# Patient Record
Sex: MALE | Race: BLACK OR AFRICAN AMERICAN | Employment: FULL TIME | URBAN - METROPOLITAN AREA
[De-identification: names, ages, dates, MRNs, and addresses within clinical notes are randomized per-mention and may not be internally consistent; named-entity substitution may affect disease eponyms.]

---

## 2022-08-05 ENCOUNTER — HOSPITAL ENCOUNTER (OUTPATIENT)
Age: 52
Setting detail: OUTPATIENT SURGERY
Discharge: HOME | End: 2022-08-05
Attending: INTERNAL MEDICINE | Admitting: INTERNAL MEDICINE
Payer: COMMERCIAL

## 2022-08-05 ENCOUNTER — ANESTHESIA (OUTPATIENT)
Dept: ENDOSCOPY | Age: 52
End: 2022-08-05
Payer: COMMERCIAL

## 2022-08-05 ENCOUNTER — ANESTHESIA EVENT (OUTPATIENT)
Dept: ENDOSCOPY | Age: 52
End: 2022-08-05
Payer: COMMERCIAL

## 2022-08-05 VITALS
OXYGEN SATURATION: 100 % | BODY MASS INDEX: 42.66 KG/M2 | DIASTOLIC BLOOD PRESSURE: 72 MMHG | WEIGHT: 315 LBS | HEART RATE: 50 BPM | TEMPERATURE: 97.6 F | HEIGHT: 72 IN | SYSTOLIC BLOOD PRESSURE: 139 MMHG | RESPIRATION RATE: 16 BRPM

## 2022-08-05 PROCEDURE — 77030021593 HC FCPS BIOP ENDOSC BSC -A: Performed by: INTERNAL MEDICINE

## 2022-08-05 PROCEDURE — 74011250636 HC RX REV CODE- 250/636: Performed by: INTERNAL MEDICINE

## 2022-08-05 PROCEDURE — 77030013991 HC SNR POLYP ENDOSC BSC -A: Performed by: INTERNAL MEDICINE

## 2022-08-05 PROCEDURE — 2709999900 HC NON-CHARGEABLE SUPPLY: Performed by: INTERNAL MEDICINE

## 2022-08-05 PROCEDURE — 76040000019: Performed by: INTERNAL MEDICINE

## 2022-08-05 PROCEDURE — 76060000031 HC ANESTHESIA FIRST 0.5 HR: Performed by: INTERNAL MEDICINE

## 2022-08-05 PROCEDURE — 88305 TISSUE EXAM BY PATHOLOGIST: CPT

## 2022-08-05 PROCEDURE — 74011000250 HC RX REV CODE- 250: Performed by: INTERNAL MEDICINE

## 2022-08-05 RX ORDER — FLUMAZENIL 0.1 MG/ML
0.2 INJECTION INTRAVENOUS
Status: CANCELLED | OUTPATIENT
Start: 2022-08-05 | End: 2022-08-05

## 2022-08-05 RX ORDER — SODIUM CHLORIDE 0.9 % (FLUSH) 0.9 %
5-40 SYRINGE (ML) INJECTION AS NEEDED
Status: DISCONTINUED | OUTPATIENT
Start: 2022-08-05 | End: 2022-08-05 | Stop reason: HOSPADM

## 2022-08-05 RX ORDER — AMLODIPINE BESYLATE 10 MG/1
TABLET ORAL DAILY
COMMUNITY

## 2022-08-05 RX ORDER — PROPOFOL 10 MG/ML
INJECTION, EMULSION INTRAVENOUS AS NEEDED
Status: DISCONTINUED | OUTPATIENT
Start: 2022-08-05 | End: 2022-08-05 | Stop reason: HOSPADM

## 2022-08-05 RX ORDER — ATENOLOL 25 MG/1
25 TABLET ORAL DAILY
COMMUNITY

## 2022-08-05 RX ORDER — BUMETANIDE 1 MG/1
TABLET ORAL DAILY
COMMUNITY

## 2022-08-05 RX ORDER — EPINEPHRINE 0.1 MG/ML
1 INJECTION INTRACARDIAC; INTRAVENOUS
Status: CANCELLED | OUTPATIENT
Start: 2022-08-05 | End: 2022-08-06

## 2022-08-05 RX ORDER — ATORVASTATIN CALCIUM 80 MG/1
80 TABLET, FILM COATED ORAL DAILY
COMMUNITY

## 2022-08-05 RX ORDER — LIDOCAINE HYDROCHLORIDE 20 MG/ML
INJECTION, SOLUTION EPIDURAL; INFILTRATION; INTRACAUDAL; PERINEURAL AS NEEDED
Status: DISCONTINUED | OUTPATIENT
Start: 2022-08-05 | End: 2022-08-05 | Stop reason: HOSPADM

## 2022-08-05 RX ORDER — DEXTROMETHORPHAN/PSEUDOEPHED 2.5-7.5/.8
1.2 DROPS ORAL
Status: CANCELLED | OUTPATIENT
Start: 2022-08-05

## 2022-08-05 RX ORDER — NALOXONE HYDROCHLORIDE 0.4 MG/ML
0.4 INJECTION, SOLUTION INTRAMUSCULAR; INTRAVENOUS; SUBCUTANEOUS
Status: CANCELLED | OUTPATIENT
Start: 2022-08-05 | End: 2022-08-05

## 2022-08-05 RX ORDER — ATROPINE SULFATE 0.1 MG/ML
0.5 INJECTION INTRAVENOUS
Status: CANCELLED | OUTPATIENT
Start: 2022-08-05 | End: 2022-08-06

## 2022-08-05 RX ORDER — LISINOPRIL 5 MG/1
TABLET ORAL DAILY
COMMUNITY

## 2022-08-05 RX ORDER — SODIUM CHLORIDE 0.9 % (FLUSH) 0.9 %
5-40 SYRINGE (ML) INJECTION EVERY 8 HOURS
Status: DISCONTINUED | OUTPATIENT
Start: 2022-08-05 | End: 2022-08-05 | Stop reason: HOSPADM

## 2022-08-05 RX ORDER — SODIUM CHLORIDE 9 MG/ML
125 INJECTION, SOLUTION INTRAVENOUS CONTINUOUS
Status: DISCONTINUED | OUTPATIENT
Start: 2022-08-05 | End: 2022-08-05 | Stop reason: HOSPADM

## 2022-08-05 RX ADMIN — PROPOFOL 30 MG: 10 INJECTION, EMULSION INTRAVENOUS at 15:02

## 2022-08-05 RX ADMIN — SODIUM CHLORIDE 125 ML/HR: 9 INJECTION, SOLUTION INTRAVENOUS at 14:14

## 2022-08-05 RX ADMIN — PROPOFOL 30 MG: 10 INJECTION, EMULSION INTRAVENOUS at 15:03

## 2022-08-05 RX ADMIN — LIDOCAINE HYDROCHLORIDE 60 MG: 20 INJECTION, SOLUTION INTRAVENOUS at 14:56

## 2022-08-05 RX ADMIN — PROPOFOL 30 MG: 10 INJECTION, EMULSION INTRAVENOUS at 14:58

## 2022-08-05 RX ADMIN — PROPOFOL 30 MG: 10 INJECTION, EMULSION INTRAVENOUS at 15:10

## 2022-08-05 RX ADMIN — PROPOFOL 30 MG: 10 INJECTION, EMULSION INTRAVENOUS at 15:07

## 2022-08-05 RX ADMIN — PROPOFOL 30 MG: 10 INJECTION, EMULSION INTRAVENOUS at 15:05

## 2022-08-05 RX ADMIN — PROPOFOL 30 MG: 10 INJECTION, EMULSION INTRAVENOUS at 15:00

## 2022-08-05 RX ADMIN — PROPOFOL 120 MG: 10 INJECTION, EMULSION INTRAVENOUS at 14:56

## 2022-08-05 RX ADMIN — PROPOFOL 30 MG: 10 INJECTION, EMULSION INTRAVENOUS at 15:04

## 2022-08-05 RX ADMIN — PROPOFOL 30 MG: 10 INJECTION, EMULSION INTRAVENOUS at 15:08

## 2022-08-05 NOTE — PROCEDURES
Colonoscopy Procedure Note    Indications: Previous adenomatous polyp    Anesthesia/Sedation: MAC anesthesia Propofol    Pre-Procedure Exam:  Airway: clear   Heart: normal S1and S2    Lungs: clear bilateral  Abdomen: soft, nontender, bowel sounds present and normal in all quadrants   Mental Status: awake, alert, and oriented to person, place, and time      Procedure in Detail:  Informed consent was obtained for the procedure, including sedation. Risks of perforation, hemorrhage, adverse drug reaction, and aspiration were discussed. The patient was placed in the left lateral decubitus position. Based on the pre-procedure assessment, including review of the patient's medical history, medications, allergies, and review of systems, he had been deemed to be an appropriate candidate for moderate sedation; he was therefore sedated with the medications listed above. The patient was monitored continuously with ECG tracing, pulse oximetry, blood pressure monitoring, and direct observations. A rectal examination was performed. The [id number] was inserted into the rectum and advanced under direct vision to the cecum, which was identified by the ileocecal valve and appendiceal orifice. The quality of the colonic preparation was excellent. A careful inspection was made as the colonoscope was withdrawn, including a retroflexed view of the rectum; findings and interventions are described below. Appropriate photodocumentation was obtained. ANUS: Anal exam reveals no masses or hemorrhoids, sphincter tone is normal.   RECTUM: Rectal exam reveals no masses or hemorrhoids. 5mm rectal polyp removed with cold snare  SIGMOID COLON: The mucosa is normal with good vascular pattern and without ulcers, diverticula, and polyps. DESCENDING COLON: The mucosa is normal with good vascular pattern and without ulcers, diverticula, and polyps.    SPLENIC FLEXURE: The splenic flexure is normal.   TRANSVERSE COLON: The mucosa is normal with good vascular pattern and without ulcers, diverticula, and polyps. HEPATIC FLEXURE: The hepatic flexure is normal.   ASCENDING COLON: The mucosa is normal with good vascular pattern and without ulcers, diverticula, and polyps. CECUM: The appendiceal orifice appears normal. The ileocecal valve appears normal.   TERMINAL ILEUM: The terminal ileum was not entered. Specimens: Specimens were collected and sent to pathology. EBL: None    No prosthetic devices, grafts, tissues, transplant or devices implanted. Withdrawal Time: 9 min    Complications: None; patient tolerated the procedure well. Attending Attestation: I performed the procedure. Recommendations:   - Await pathology.     Signed By: George Carlos MD                      8/5/2022

## 2022-08-05 NOTE — DISCHARGE INSTRUCTIONS
Dl Mckeon  [de-identified]  1970    COLON DISCHARGE INSTRUCTIONS    Discomfort:  Redness at IV site- apply warm compress to area; if redness or soreness persist- contact your physician  There may be a slight amount of blood passed from the rectum  Gaseous discomfort- walking, belching will help relieve any discomfort  You should not operate a vehicle for 12 hours  You should not engage in an occupation involving machinery or appliances for rest of today  You may not drink alcoholic beverages for at least 12 hours  Avoid making any critical decisions for at least 24 hour  DIET:   Regular diet. - however -  remember your colon is empty and a heavy meal will produce gas. Avoid these foods:  vegetables, fried / greasy foods, carbonated drinks for today     ACTIVITY:  You may resume your normal daily activities it is recommended that you spend the remainder of the day resting -  avoid any strenuous activity. CALL M.D. ANY SIGN OF:   Increasing pain, nausea, vomiting  Abdominal distension (swelling)  New increased bleeding (oral or rectal)  Fever (chills)  Pain in chest area  Bloody discharge from nose or mouth  Shortness of breath      Follow-up Instructions: Will send polyp pathology results and when next colonoscopy due.                           Dl Mckeon  [de-identified]  1970        DISCHARGE SUMMARY from Nurse    The following personal items collected during your admission are returned to you:   Dental Appliance: Dental Appliances: None  Vision: Visual Aid: Glasses  Hearing Aid:    Jewelry:    Clothing:    Other Valuables:    Valuables sent to safe:                  DISCHARGE SUMMARY from Nurse    The following personal items collected during your admission are returned to you:   Dental Appliance: Dental Appliances: None  Vision: Visual Aid: Glasses  Hearing Aid:    Jewelry:    Clothing:    Other Valuables:    Valuables sent to safe:              PATIENT INSTRUCTIONS:    After general anesthesia or intravenous sedation, for 24 hours or while taking prescription Narcotics:  Limit your activities  Do not drive and operate hazardous machinery  Do not make important personal or business decisions  Do  not drink alcoholic beverages  If you have not urinated within 8 hours after discharge, please contact your surgeon on call. Report the following to your surgeon:  Excessive pain, swelling, redness or odor of or around the surgical area  Temperature over 100.5  Nausea and vomiting lasting longer than 4 hours or if unable to take medications  Any signs of decreased circulation or nerve impairment to extremity: change in color, persistent  numbness, tingling, coldness or increase pain  Any questions      [unfilled]    What to do at Home:  Recommended activity: as above,     If you experience any of the following symptoms as above, please follow up with Dr. Gael Sky. *  Please give a list of your current medications to your Primary Care Provider. *  Please update this list whenever your medications are discontinued, doses are      changed, or new medications (including over-the-counter products) are added. *  Please carry medication information at all times in case of emergency situations. These are general instructions for a healthy lifestyle:    No smoking/ No tobacco products/ Avoid exposure to second hand smoke    Surgeon General's Warning:  Quitting smoking now greatly reduces serious risk to your health. Obesity, smoking, and sedentary lifestyle greatly increases your risk for illness    A healthy diet, regular physical exercise & weight monitoring are important for maintaining a healthy lifestyle    You may be retaining fluid if you have a history of heart failure or if you experience any of the following symptoms:  Weight gain of 3 pounds or more overnight or 5 pounds in a week, increased swelling in our hands or feet or shortness of breath while lying flat in bed.   Please call your doctor as soon as you notice any of these symptoms; do not wait until your next office visit. Recognize signs and symptoms of STROKE:    F-face looks uneven    A-arms unable to move or move unevenly    S-speech slurred or non-existent    T-time-call 911 as soon as signs and symptoms begin-DO NOT go       Back to bed or wait to see if you get better-TIME IS BRAIN. The discharge information has been reviewed with the patient and spouse. The patient and spouse verbalized understanding. Warning Signs of HEART ATTACK     Call 911 if you have these symptoms:  Chest discomfort. Most heart attacks involve discomfort in the center of the chest that lasts more than a few minutes, or that goes away and comes back. It can feel like uncomfortable pressure, squeezing, fullness, or pain. Discomfort in other areas of the upper body. Symptoms can include pain or discomfort in one or both arms, the back, neck, jaw, or stomach. Shortness of breath with or without chest discomfort. Other signs may include breaking out in a cold sweat, nausea, or lightheadedness. Don't wait more than five minutes to call 911 - MINUTES MATTER! Fast action can save your life. Calling 911 is almost always the fastest way to get lifesaving treatment. Emergency Medical Services staff can begin treatment when they arrive -- up to an hour sooner than if someone gets to the hospital by car. The discharge information has been reviewed with the patient and caregiver. The patient and caregiver verbalized understanding. Discharge medications reviewed with the patient and guardian and appropriate educational materials and side effects teaching were provided.     Patient armband removed and shredded

## 2022-08-05 NOTE — H&P
[facility]ology [address] [address]  Tel: [phone]  Fax: [phone]    Patient:  Rick Paula  YOB: 1970  Birth Sex:  Male  Current Gender:  Male  Date:   06/13/2022 8:00 AM   Historian:    self  Visit Type:   Office Visit        Completed Orders (This Visit)  Order  Details  Reason  Side  Interpretation  Result  Initial Treatment Date  Region  Referrals: Gastroenterology. Rebecca Paulson MD. Evaluate and treat                Weight monitoring                Dietary management education, guidance, and counseling                  Assessment/Plan  #  Detail Type  Description   1. Assessment  Benign essential hypertension (I10). Patient Plan  This pleasant 47 y/o gentleman was referred for repeat colonoscopy. He is advised to take his antihypertensive medication as prescribed on day of procedure. 2.  Assessment  Morbid (severe) obesity due to excess calories (E66.01). Patient Plan  Procedure to be done at [facility]         3. Assessment  Personal history of colonic polyps (Z86.010). Patient Plan  His last colo was in 2019 with four benign polyps and recommendation to return in 3 years. Pertinent negatives include family history of CRC, personal history of cancer, changes in bowel habits, hematochezia, abdominal pain, n&v and unintentional weight loss. Schedule for COLO with MAC, miralax prep. The risks, benefits, adverse reactions were discussed in detail today with the patient. This includes, but is not limited to, the risk of bleeding, infections, perforation, death, missed lesions, reactions to anesthesia/sedation, other. Patient understands and agrees to undergo the procedure. 4.  Assessment  Body mass index (BMI) 50-59.9 , adult (Z68.43). Plan Orders  Today's instructions / counseling include(s) Dietary management education, guidance, and counseling. Weight monitoring              This 46year old patient was referred by Harlie Sandifer. This 46year old male presents for Hx polyp/colon cancer. History of Present Illness  1. Hx polyp/colon cancer   Prior screening:  Colonoscopy 2019. Risk Factors: History of Polyps. There are no associated symptoms. Pertinent negatives include abdominal pain, change in bowel habits, change in stool caliber, constipation, decreased appetite, diarrhea, melena, nausea, rectal bleeding, vomiting, weight gain and weight loss. Problem List  Problem List reviewed. Problem Description  Onset Date  Chronic  Clinical Status  Notes  Allergic rhinitis  2015  Y      Obstructive sleep apnea of adult  2000  Y      Vitamin D deficiency  10/07/2014  Y      Hyperlipidemia  2008  Y      Benign essential hypertension  2007  Y      Morbid obesity  2007  Y      Long-term current use of drug therapy  2007  Y      Type 2 diabetes  2020  Y        Past Medical/Surgical History   (Detailed)  Disease/disorder  Onset Date  Management  Date  Comments  Vitamin D deficiency              Colonoscopy with possible biopsies and/or polypectomies as needed      Elevated lipids          Hypertension          Sleep apnea                Family History   (Detailed)    Relationship  Family Member Name    Age at Death  Condition  Onset Age  Cause of Death  Brother  Carla Olmstead  Father  Cole Sosa  Father  Yuval Dozier    Hypertension    N  Mother  Cristal  Y        N  Mother  Cristal  Y    Hypertension    N    Social History  (Detailed)  Tobacco use reviewed. Preferred language is [place]. Marital Status/Family/Social Support  Marital status:     Tobacco use status: Ex-cigarette smoker. Smoking status: Former smoker.     Tobacco Screening  Patient has used tobacco.     Smoking Status  Type  Smoking Status  Usage Per Day  Years Used  Pack Years  Total Pack Years  Cigarette  Former smoker  1 Packs  19.00  19.00  19.00    Vaping Use  Screened for vaping? Yes  Status: Not a current user    Tobacco/Vaping Exposure  No passive smoke exposure. Alcohol  There is a history of alcohol use. Type: Beer and liquor. 3 beers consumed weekly. Last alcoholic drink was last week. Caffeine  The patient uses caffeine: tea and soda. - 32 oz a day. Lifestyle  Sedentary activity level. Exercises 2-3 times a week. Diet  healthy. Medications (active prior to today)  Medication  Instructions  Start Date  Stop Date  Refilled  Elsewhere  Vitamin D3 2,000 unit capsule  take 1 capsule daily  10/07/2014      N  amlodipine 10 mg tablet  take 1 tablet by oral route  every day  03/30/2022 03/30/2022  N  lisinopril 40 mg tablet  take 1 tablet by oral route  every day  04/14/2022 04/14/2022  N  atenolol 100 mg tablet  take 1 tablet by oral route  every day  04/14/2022 04/14/2022  N  bumetanide 1 mg tablet  TAKE 1 TABLET BID  05/17/2022 05/17/2022  N  atorvastatin 80 mg tablet  TAKE 1 TABLET DAILY  05/23/2022 05/23/2022  N    Patient Status   Completed with information received for patient transitioning into care. Completed with information received for patient in a summary of care record. Medication Reconciliation  Medications reconciled today.     Medication Reviewed  Adherence  Medication Name  Sig Desc  Elsewhere  Status  taking as directed  Vitamin D3 2,000 unit capsule  take 1 capsule daily  N  Verified  taking as directed  amlodipine 10 mg tablet  take 1 tablet by oral route  every day  N  Verified  taking as directed  lisinopril 40 mg tablet  take 1 tablet by oral route  every day  N  Verified  taking as directed  atenolol 100 mg tablet  take 1 tablet by oral route  every day  N  Verified  taking as directed  bumetanide 1 mg tablet  TAKE 1 TABLET BID  N  Verified  taking as directed  atorvastatin 80 mg tablet  TAKE 1 TABLET DAILY  N  Verified    Medications (Added, Continued or Stopped today)  Start Date  Medication  Directions  PRN Status  PRN Reason  Instruction  Stop Date  03/30/2022  amlodipine 10 mg tablet  take 1 tablet by oral route  every day  N        04/14/2022  atenolol 100 mg tablet  take 1 tablet by oral route  every day  N        05/23/2022  atorvastatin 80 mg tablet  TAKE 1 TABLET DAILY  N        05/17/2022  bumetanide 1 mg tablet  TAKE 1 TABLET BID  N        04/14/2022  lisinopril 40 mg tablet  take 1 tablet by oral route  every day  N        10/07/2014  Vitamin D3 2,000 unit capsule  take 1 capsule daily  N          Allergies  Ingredient  Reaction (Severity)  Medication Name  Comment  NO KNOWN ALLERGIES          Reviewed, no changes. Review of Systems  System  Neg/Pos  Details  Constitutional  Negative  Chills, Fever, Weight gain and Weight loss. ENMT  Negative  Ear infections and Nasal congestion. Respiratory  Negative  Chronic cough, Dyspnea and Wheezing. Cardio  Negative  Chest pain. GI  Negative  Abdominal pain, Change in bowel habits, Change in stool caliber, Constipation, Decreased appetite, Diarrhea, Melena, Nausea, Rectal bleeding and Vomiting.   Negative  Dysuria. Endocrine  Negative  Cold intolerance and Heat intolerance. Neuro  Negative  Dizziness and Headache. Psych  Negative  Anxiety and Depression. MS  Negative  Back pain and Joint pain. Hema/Lymph  Negative  Easy bleeding and Easy bruising.       Vital Signs  Height  Time  ft  in  cm  Last Measured  Height Position  8:04 AM  6.0  0.00  182.88  06/13/2022  Standing    Weight/BSA/BMI  Time  lb  oz  kg  Context  BMI kg/m2  BSA m2  8:04 AM  385.40    174.814  dressed with shoes  52.27      Blood Pressure  Time  BP mm/Hg  Position  Side  Site  Method  Cuff Size  8:04 AM  122/72  sitting  left  arm  manual  adult large    Temperature/Pulse/Respiration  Time  Temp F  Temp C  Temp Site  Pulse/min  Pattern  Resp/ min  8:04 AM  97.00  36.11    55        Pulse Oximetry/FIO2  Time  Pulse Ox (Rest %)  Pulse Ox (Amb %)  O2 Sat  O2 L/Min  Timing  FiO2 %  L/min  Delivery Method  Finger Probe  8:04 AM  97                    Measured by  Time  Measured by  8:04 AM  [place]    Physical  Exam  Exam  Findings  Details  Constitutional  *  Overall appearance - > IBW. Constitutional  Normal  No acute distress. Well nourished. Head/Face  Normal  Facial features - Normal. Eyebrows - Normal.  Eyes  Normal  General - Right: Normal, Left: Normal. Lids/external - Right: Normal, Left: Normal. Conjunctiva - Right: Normal, Left: Normal. Sclera - Right: Normal, Left: Normal.  Ears  Normal  Inspection - Right: Normal, Left: Normal.  Nose/Mouth/Throat  Normal  External nose - Normal. Lips/teeth/gums - Normal.  Nose/Mouth/Throat  Normal  External nose - Normal. Septum - Normal. Lips/teeth/gums - Normal. Tongue - Normal.  Neck Exam  Normal  Inspection - Normal. Palpation - Normal. Thyroid gland - Normal. Range of motion - Normal.  Respiratory  Normal  Inspection - Normal. Auscultation - Normal. Effort - Normal.  Cardiovascular  Normal  Inspection - JVD: Absent. Palpation/percussion - PMI normal. Heart rate - Regular rate. Rhythm - Regular. Heart sounds - Normal S1, Normal S2. Abdomen  Normal  Patient is not obese. Inspection - Normal. Appliance(s) - None. Abdominal muscles - Normal. Auscultation - Normal. Percussion - Normal. Anterior palpation - Normal, No guarding, No rebound. CVA tenderness - None. Umbilicus - Normal. No abdominal tenderness. No hepatic enlargement. No spleen enlargement. No hernia. No ascites. No palpable mass. Ribeiro's sign - Normal. No hepatic tenderness. Genitourinary  Normal  No CVA Tenderness. No flank mass. Skin  *  Body areas examined - Scalp/hair, Head/face, Neck, Abdomen.   Skin  Normal  Inspection - Normal. Nails - Normal. Hair - Normal.  Musculoskeletal  Normal  Overview - Normal. Hands - Left: Normal, Right: Normal.  Neurological  Normal  Level of consciousness - Normal. Orientation - Normal. Memory - Normal. Cranial nerves - Cranial nerves II through XII grossly intact. Sensory - Normal. Motor - Normal. Balance & gait - Normal. Coordination - Normal.  Psychiatric  Normal  Orientation - Oriented to time, place, person & situation. Not anxious. Behavior is appropriate for age. Sufficient fund of knowledge. Sufficient language. No memory loss. No mood swings. Normal insight. Normal judgment. Immunizations Entered by History  Date  Immunization  2/19/2010 12:00:00 AM  tetanus toxoid, reduced diphtheria toxoid, and acellular pertussis vaccine, adsorbed      Active Patient Care Team Members  Name  Contact  Agency Type  Support Role  Relationship  Active Date  Inactive Date  Specialty  Sherita Mends      encounter provider        [facility]      Patient provider  PCP      Family Practice  Ivette Jacobson      encounter provider        Gastroenterology  José Munoz      Emergency Contact  Marily Briggs      encounter provider        Gastroenterology  Dl Thrasher      encounter provider        Pulmonary Medicine      Encounter submitted for review by Ivette ALONSO on 06/13/2022 8:20 AM.    Visit details reviewed and approved by supervising provider Paty Lin MD on 06/13/2022. Document generated by: Paty Lin 06/13/2022      ----------------------------------------------------------------------------------------------------------------------------------------------------------------------      Electronically signed by Ivette ALONSO on 06/14/2022 08:10 AM    Pt seen and examined. No interval change.

## 2022-08-05 NOTE — ANESTHESIA PREPROCEDURE EVALUATION
Relevant Problems   No relevant active problems       Anesthetic History   No history of anesthetic complications            Review of Systems / Medical History  Patient summary reviewed, nursing notes reviewed and pertinent labs reviewed    Pulmonary  Within defined limits                 Neuro/Psych   Within defined limits           Cardiovascular    Hypertension: well controlled              Exercise tolerance: >4 METS     GI/Hepatic/Renal  Within defined limits              Endo/Other        Morbid obesity     Other Findings              Physical Exam    Airway  Mallampati: I  TM Distance: > 6 cm  Neck ROM: normal range of motion   Mouth opening: Normal     Cardiovascular    Rhythm: regular  Rate: normal         Dental  No notable dental hx       Pulmonary  Breath sounds clear to auscultation               Abdominal  GI exam deferred       Other Findings            Anesthetic Plan    ASA: 3  Anesthesia type: MAC    Monitoring Plan: Continuous noninvasive hemodynamic monitoring      Induction: Intravenous  Anesthetic plan and risks discussed with: Patient

## 2022-08-05 NOTE — ANESTHESIA POSTPROCEDURE EVALUATION
Post-Anesthesia Evaluation and Assessment    Cardiovascular Function/Vital Signs  Visit Vitals  /80   Pulse 65   Temp 36.4 °C (97.6 °F)   Resp 16   Ht 6' (1.829 m)   Wt (!) 171.5 kg (378 lb)   SpO2 98%   BMI 51.27 kg/m²       Patient is status post Procedure(s):  COLONOSCOPY WITH MAC;POLYPECTOMY. Nausea/Vomiting: Controlled. Postoperative hydration reviewed and adequate. Pain:  Pain Scale 1: Numeric (0 - 10) (08/05/22 1525)  Pain Intensity 1: 0 (08/05/22 1525)   Managed. Neurological Status: At baseline. Mental Status and Level of Consciousness: Baseline and stable. Pulmonary Status:   O2 Device: None (Room air) (08/05/22 1525)   Adequate oxygenation and airway patent. Complications related to anesthesia: None    Post-anesthesia assessment completed. No concerns. Patient has met all discharge requirements.     Signed By: Micaela Perdomo MD

## (undated) DEVICE — TRNQT TEXT 1X18IN BLU LF DISP -- CONVERT TO ITEM 362165

## (undated) DEVICE — EJECTOR SALIVA 6 IN FLX CLR

## (undated) DEVICE — KENDALL RADIOLUCENT FOAM MONITORING ELECTRODE RECTANGULAR SHAPE: Brand: KENDALL

## (undated) DEVICE — TRAP SPEC COLL POLYP POLYSTYR --

## (undated) DEVICE — FORCEPS BX CAP 240CM L RAD JAW 4

## (undated) DEVICE — MOUTHPIECE ENDOSCP 20X27MM --

## (undated) DEVICE — SINGLE PORT MANIFOLD: Brand: NEPTUNE 2

## (undated) DEVICE — SPONGE GZ W4XL4IN RAYON POLY 4 PLY NONWOVEN FASTER WICKING

## (undated) DEVICE — 4-PORT MANIFOLD: Brand: NEPTUNE 2

## (undated) DEVICE — SYR 3ML LL TIP 1/10ML GRAD --

## (undated) DEVICE — SYR 50ML SLIP TIP NSAF LF STRL --

## (undated) DEVICE — MAJ-1414 SINGLE USE ADPATER BIOPSY VALV: Brand: SINGLE USE ADAPTOR BIOPSY VALVE

## (undated) DEVICE — BRUSH CYTO L240CM DIA2.3MM GI COLONOSCOPY 3 RNG HNDL RADPQ

## (undated) DEVICE — NDL PRT INJ NSAF BLNT 18GX1.5 --

## (undated) DEVICE — SET ADMIN 16ML TBNG L100IN 2 Y INJ SITE IV PIGGY BK DISP

## (undated) DEVICE — ENDO CARRY-ON PROCEDURE KIT INCLUDES ENZYMATIC SPONGE, GAUZE, BIOHAZARD LABEL, TRAY, LUBRICANT, DIRTY SCOPE LABEL, WATER LABEL, TRAY, DRAWSTRING PAD, AND DEFENDO 4-PIECE KIT.: Brand: ENDO CARRY-ON PROCEDURE KIT

## (undated) DEVICE — SNARE POLYP SM W13MMXL240CM SHTH DIA2.4MM OVL FLX DISP

## (undated) DEVICE — CATH IV SAFE STR 22GX1IN BLU -- PROTECTIV PLUS

## (undated) DEVICE — AIRLIFE™ NASAL OXYGEN CANNULA CURVED, FLARED TIP WITH 14 FOOT (4.3 M) CRUSH-RESISTANT TUBING, OVER-THE-EAR STYLE: Brand: AIRLIFE™

## (undated) DEVICE — Device